# Patient Record
(demographics unavailable — no encounter records)

---

## 2025-02-28 NOTE — REASON FOR VISIT
[New Patient Visit] : a new patient visit [Referred By: _________] : Patient was referred by YOAN [FreeTextEntry1] : acute lower back pain

## 2025-02-28 NOTE — HISTORY OF PRESENT ILLNESS
[FreeTextEntry1] : lower back pain  [de-identified] : FREDDIE NICHOLS is a 40 year old female presents for initial neurosurgical evaluation of lower back pain.  No significant medical history.  Patient mentions she had an acute exacerbation of lower back pain after lifting an item at work and they had to call the ambulance to bring her [facility] ER.  She was treated and discharged for recommendation to follow-up in our office.  Today she continues to have acute midline eccentric to the right without any radicular component pain intensity is 8 out of 10 at its worst is 10 out of 10 denies any urinary changes worse with bending lifting and twisting and improves with rest and modification of activities.  She was discharged with methocarbamol 750 to take every 8 hours and Tylenol 650 to take every 4-6 hours and she still moderate amount of pain.  She has not returned to her work duties as of yet.  She has never had any back issues in the past since she has not started Zick[facility] therapy or pain management as of yet

## 2025-02-28 NOTE — PHYSICAL EXAM
[General Appearance - Alert] : alert [General Appearance - In No Acute Distress] : in no acute distress [Oriented To Time, Place, And Person] : oriented to person, place, and time [Impaired Insight] : insight and judgment were intact [Affect] : the affect was normal [Person] : oriented to person [Place] : oriented to place [Cranial Nerves Trigeminal (V)] : facial sensation intact symmetrically [Cranial Nerves Facial (VII)] : face symmetrical [Motor Tone] : muscle tone was normal in all four extremities [Motor Strength] : muscle strength was normal in all four extremities [Sensation Tactile Decrease] : light touch was intact [Sensation Pain / Temperature Decrease] : pain and temperature was intact [Abnormal Walk] : normal gait [Balance] : balance was intact

## 2025-02-28 NOTE — ASSESSMENT
[FreeTextEntry1] : FREDDIE NICHOLS is a 40 year old female presents for initial neurosurgical evaluation of lower back pain.  No significant medical history.  Patient mentions she had an acute exacerbation of lower back pain after lifting an item at work and they had to call the ambulance to bring her [facility] ER.  She was treated and discharged for recommendation to follow-up in our office.  Today she continues to have acute midline eccentric to the right without any radicular component pain intensity is 8 out of 10 at its worst is 10 out of 10 denies any urinary changes worse with bending lifting and twisting and improves with rest and modification of activities.  She was discharged with methocarbamol 750 to take every 8 hours and Tylenol 650 to take every 4-6 hours and she still moderate amount of pain.  She has not returned to her work duties as of yet.  She has never had any back issues in the past since she has not started Zickel therapy or pain management as of yet.  Plan x-ray of the lumbar spine with AP and lateral to assess for any structural abnormalities accounting for low back pain.  In the interim we will provide her a note to convalesce from work for an additional week and she will start the physical therapy to alleviate her moderate to severe lower back pain I have prescribed her meloxicam 7.5 mg and that she will continue the methocarbamol 751 hours as needed for pain.  I like to see her back in about 6 weeks just to assess her interval lower back pain

## 2025-03-14 NOTE — REASON FOR VISIT
[Follow-Up: _____] : a [unfilled] follow-up visit [FreeTextEntry1] : FREDDIE NICHOLS is a 40 year old female presents for follow up visit for complaints of lower back pain.  No significant medical history.  Patient mentions she had an acute exacerbation of lower back pain after lifting an item at work and they had to call the ambulance to bring her [facility] ER.  She was treated and discharged for recommendation to follow-up in our office.  Today she continues to have acute midline eccentric to the right without any radicular component pain intensity is 8 out of 10 at its worst is 10 out of 10 denies any urinary changes worse with bending lifting and twisting and improves with rest and modification of activities.  She was discharged with methocarbamol 750 to take every 8 hours and Tylenol 650 to take every 4-6 hours and she still moderate amount of pain.  She has not returned to her work duties as of yet.  She has never had any back issues in the past since she has not started Zickel therapy or pain management as of yet

## 2025-03-14 NOTE — ASSESSMENT
[FreeTextEntry1] : FREDDIE NICHOLS is a 40 year old female presents for initial neurosurgical evaluation of lower back pain.  No significant medical history.  Patient mentions she had an acute exacerbation of lower back pain after lifting an item at work and they had to call the ambulance to bring her to  [facility] ER.  She was treated and discharged for recommendation to follow-up in our office.  Today she continues to have acute midline eccentric to the right without any radicular component pain intensity is 8 out of 10 at its worst is 10 out of 10 denies any urinary changes worse with bending lifting and twisting and improves with rest and modification of activities.  She was discharged with methocarbamol 750 to take every 8 hours and Tylenol 650 to take every 4-6 hours and she still moderate amount of pain.  She has returned to work on 3/10/2025.  She has completed 3 sessions of PT and endorses significant relief of LBP.  Xray reveals moderate narrowing the level of L5- S1. She feels much improvement. Antiinflammatory was recommended for management of symptoms and pain. Patient has been referred to physical therapy for strengthening and to decrease pain modalities and core strengthening.  We discussed the benefits of alternating heat, ice  and Icy Hot Cream.  Patient  may try gentle stretches at home in the interim.  Patient will follow up in 4-6 weeks for a formal clinical assessment and evaluate recovery.